# Patient Record
Sex: MALE | Race: OTHER
[De-identification: names, ages, dates, MRNs, and addresses within clinical notes are randomized per-mention and may not be internally consistent; named-entity substitution may affect disease eponyms.]

---

## 2017-12-12 ENCOUNTER — HOSPITAL ENCOUNTER (EMERGENCY)
Dept: HOSPITAL 24 - ER | Age: 32
Discharge: HOME | End: 2017-12-12
Payer: SELF-PAY

## 2017-12-12 VITALS — SYSTOLIC BLOOD PRESSURE: 175 MMHG | DIASTOLIC BLOOD PRESSURE: 94 MMHG

## 2017-12-12 VITALS — BODY MASS INDEX: 33.7 KG/M2

## 2017-12-12 DIAGNOSIS — J02.0: ICD-10-CM

## 2017-12-12 DIAGNOSIS — J11.1: Primary | ICD-10-CM

## 2017-12-12 DIAGNOSIS — E87.6: ICD-10-CM

## 2017-12-12 LAB
ALBUMIN SERPL BCP-MCNC: 3.9 G/DL (ref 3.4–5)
ALP SERPL-CCNC: 188 UNITS/L (ref 46–116)
ALT SERPL W P-5'-P-CCNC: 122 UNITS/L (ref 12–78)
AST SERPL W P-5'-P-CCNC: 108 UNITS/L (ref 15–37)
BASOPHILS # BLD AUTO: 0.1 X10^3/UL (ref 0–0.1)
BASOPHILS NFR BLD AUTO: 1 % (ref 0.2–1)
BUN SERPL-MCNC: 6 MG/DL (ref 7–18)
CALCIUM ALBUM COR SERPL-SCNC: (no result) MG/DL (ref 8.5–10.1)
CALCIUM ALBUM COR SERPL-SCNC: 139 MMOL/L (ref 136–145)
CALCIUM SERPL-MCNC: 8.5 MG/DL (ref 8.5–10.1)
CHLORIDE SERPL-SCNC: 97 MMOL/L (ref 98–107)
CO2 SERPL-SCNC: 23.5 MMOL/L (ref 21–32)
CREAT SERPL-MCNC: 0.81 MG/DL (ref 0.7–1.3)
CRP SERPL-MCNC: 17.3 MG/L (ref 0–3)
EGFR  BLACK RACES: > 60 (ref 60–?)
EOSINOPHIL # BLD AUTO: 0.1 X10^3/UL (ref 0–0.2)
EOSINOPHIL NFR BLD AUTO: 1.4 % (ref 0.9–2.9)
ERYTHROCYTE [DISTWIDTH] IN BLOOD BY AUTOMATED COUNT: 12.8 % (ref 11.6–16.5)
HCT VFR BLD AUTO: 49.3 % (ref 42–54)
HGB BLD-MCNC: 16.9 G/DL (ref 13.5–18)
LYMPHOCYTES # BLD AUTO: 0.9 X10^3/UL (ref 1.3–2.9)
LYMPHOCYTES NFR BLD AUTO: 9.2 % (ref 21–51)
MCH RBC QN AUTO: 26.7 PG (ref 27–34)
MCHC RBC AUTO-ENTMCNC: 34.3 G/DL (ref 33–35)
MCV RBC AUTO: 77.8 FL (ref 80–100)
MONOCYTES # BLD AUTO: 0.7 X10^3/UL (ref 0.3–0.8)
MONOCYTES NFR BLD AUTO: 7.9 % (ref 0–13)
NEUTROPHILS # BLD AUTO: 7.6 X10^3/UL (ref 2.2–4.8)
NEUTROPHILS NFR BLD AUTO: 80.5 % (ref 42–75)
PLATELET # BLD: 279 X10^3/UL (ref 150–450)
PMV BLD AUTO: 8.6 FL (ref 7.4–11)
PROT SERPL-MCNC: 8.4 G/DL (ref 6.4–8.2)
RBC # BLD AUTO: 6.34 X10^6/UL (ref 4.7–6)
SODIUM SERPL-SCNC: 134 MMOL/L (ref 136–145)
WBC NRBC COR # BLD AUTO: 9.5 X10^3/UL (ref 3.6–10)

## 2017-12-12 PROCEDURE — 85025 COMPLETE CBC W/AUTO DIFF WBC: CPT

## 2017-12-12 PROCEDURE — 87502 INFLUENZA DNA AMP PROBE: CPT

## 2017-12-12 PROCEDURE — 96367 TX/PROPH/DG ADDL SEQ IV INF: CPT

## 2017-12-12 PROCEDURE — 99283 EMERGENCY DEPT VISIT LOW MDM: CPT

## 2017-12-12 PROCEDURE — 96365 THER/PROPH/DIAG IV INF INIT: CPT

## 2017-12-12 PROCEDURE — 36415 COLL VENOUS BLD VENIPUNCTURE: CPT

## 2017-12-12 PROCEDURE — 87880 STREP A ASSAY W/OPTIC: CPT

## 2017-12-12 PROCEDURE — 80053 COMPREHEN METABOLIC PANEL: CPT

## 2017-12-12 PROCEDURE — 71010: CPT

## 2017-12-12 PROCEDURE — 86140 C-REACTIVE PROTEIN: CPT

## 2017-12-12 NOTE — DR.GENAD
HPI





- PCP


Primary Care Physician: [facility]





- Complaint/Symptoms


Chief Complaint Doctors Comments: Patient presents to the ED with complaint of 

being sick for one week with cough, congestion, fever and not feeling.  Today 

he states that he passed out at work. He has a history of hypertension


Chief Complaint:: "I FAINTED AT WORK([employer]) JUST A FEW MINUTES AGO. I HAVE THE 

WORST HEADACHE. I HAVE HAD A COLD , COUGH , CONGESTION FOR THE LAST 2 DAYS. THE 

LAST 3 HRS I HAVE JUST FELT AWEFUL. MY LITTLE GIRL HAS BEEN SICK WITH A COLD, I 

THINK SHE GAVE ME IT. MY CHEST ONLY HURTS WHEN I TRY TO BREATH."





- Source


History Provided: Patient





- Mode of Arrival


Mode of Arrival: Ambulatory





- Timing


Onset of Chief Complaint: 12/10/17





PMH





- PMH


Past Medical History: Yes


Past Medical History: Anxiety, Hypertension


Past Surgical History: No





- Family History


History of Family Medical Conditions: Yes


Family Medical History: Diabetes Mellitus, Hypertension





- Social History


Type of Tobacco Use: None


Alcohol Use: None


Do you use any recreational Drugs:: No


Lives With: Spouse, Family


Lives Where: Home





- infectious screening


Have you traveled outside the country in the last 6 months?: No


Isolation: Standard





ROS





- Review of Systems


Eyes: No Symptoms Reported


ENTM: No Symptoms Reported


Respiratoy: No Symptoms Reported


Cardiovascular: No Symptoms Reported


Gastrointestinal/Abdominal: No Symptoms Reported


Genitourinary: No Symptoms Reported


Neurological: No Symptoms Reported


Musculoskeletal: No Symptoms Reported


Integumentary: No Symptoms Reported


Hematologic/Lymphatic: No Symptoms Reported


Endocrine: No Symptoms Reported


Psychiatric: No Symptoms Reported


All Other Systems: Reviewed and Negative





PE





- Vital Signs


Vitals: 


 





Temperature                      100.7 F


Pulse Rate                       120


Respiratory Rate                 16


Blood Pressure                   182/111


O2 Sat by Pulse Oximetry         95











- General


Limitations: No Limitations


General Appearance: Alert, In No Apparent Distress





- Head


Head Exam: Normal Inspection, Atraumatic





- Eyes


Eye exam: Normal Appearance, PERRL, EOMI





- ENT


ENT Exam: negative: Normal  External Ear Exam (Left external red, tender tragus

, rhinorrhea)


External Ear Exam: Pain with Movement, Other (left canal erythematous)


TM/Canal Exam: Bilateral Normal


Nose Exam: Other (rhinorrhea)


Mouth Exam: Normal Inspection


Throat Exam: Normal Inspection





- Neck


Neck Exam: Normal Inspection, Full ROM





- Chest


Chest Inspection: Normal Inspection





- Respiratory


Respiratory Exam: Normal Lung Sounds Bilat


Respiratory Exam: Bilateral Clear to Auscultation





- Cardiovascular


Cardiovascular Exam: Regular Rate, Normal Rhythm





- Abdominal Exam


Abdominal Exam: Normal Inspection, Normal Bowel Sounds


Abdominal Tenderness: negative: RUQ, RLQ, LUQ, LLQ, Epigastrium, Suprapubic, 

Diffuse, Mild, Moderate, Severe, Other





- Extremities


Extremities Exam: Normal Inspection, Full ROM





- Back


Back Exam: Normal Inspection





- Neurologic


Neurological Exam: Alert, Oriented X3, CN II-XII Intact





- Psychiatric


Psychiatric Exam: Normal Affect





- Skin


Skin Exam: Warm, Dry, Intact





Course





- Reevaluation


1st: Improved





ROR





- Labs Reviewed


Laboratory Results Reviewed?: Yes (potassium low)


Result Diagrams: 


 12/12/17 19:39





 12/12/17 19:39


Laboratory: 


 











WBC  9.5 X10^3/uL (3.6-10.0)   12/12/17  19:39    


 


RBC  6.34 X10^6/uL (4.7-6.0)  H  12/12/17  19:39    


 


Hgb  16.9 g/dL (13.5-18.0)   12/12/17  19:39    


 


Hct  49.3 % (42.0-54.0)   12/12/17  19:39    


 


MCV  77.8 fL (80.0-100.0)  L  12/12/17  19:39    


 


MCH  26.7 pg (27.0-34.0)  L  12/12/17  19:39    


 


MCHC  34.3 g/dL (33.0-35.0)   12/12/17  19:39    


 


RDW  12.8 % (11.6-16.5)   12/12/17  19:39    


 


Plt Count  279 X10^3/uL (150.0-450.0)   12/12/17  19:39    


 


MPV  8.6 fL (7.4-11.0)   12/12/17  19:39    


 


Neut %  80.5 % (42.0-75.0)  H  12/12/17  19:39    


 


Lymph %  9.2 % (21.0-51.0)  L  12/12/17  19:39    


 


Mono %  7.9 % (0.0-13.0)   12/12/17  19:39    


 


Eos %  1.4 % (0.9-2.9)   12/12/17  19:39    


 


Baso %  1.0 % (0.2-1.0)   12/12/17  19:39    


 


Neut #  7.6 x10^3/uL (2.2-4.8)  H  12/12/17  19:39    


 


Lymph #  0.9 X10^3/uL (1.3-2.9)  L  12/12/17  19:39    


 


Mono #  0.7 x10^3/uL (0.3-0.8)   12/12/17  19:39    


 


Eos #  0.1 x10^3/uL (0.0-0.2)   12/12/17  19:39    


 


Baso #  0.1 X10^3/uL (0.0-0.1)   12/12/17  19:39    


 


Absolute Nucleated RBC  0.0 /100WBC  12/12/17  19:39    


 


Sodium  134 mmol/L (136-145)  L  12/12/17  19:39    


 


Corrected Sodium  139 mmol/L (136-145)   12/12/17  19:39    


 


Potassium  3.3 mmol/L (3.5-5.1)  L  12/12/17  19:39    


 


Chloride  97 mmol/L ()  L  12/12/17  19:39    


 


Carbon Dioxide  23.5 mmol/L (21-32)   12/12/17  19:39    


 


BUN  6 mg/dL (7-18)  L  12/12/17  19:39    


 


Creatinine  0.81 mg/dL (0.70-1.30)   12/12/17  19:39    


 


Est GFR (MDRD) Af Amer  > 60  (>60)   12/12/17  19:39    


 


Est GFR (MDRD) Non-Af  > 60  (>60)   12/12/17  19:39    


 


Glucose  295 mg/dL (65-99)  H  12/12/17  19:39    


 


Calcium  8.5 mg/dL (8.5-10.1)   12/12/17  19:39    


 


Corrected Calcium  TNP   12/12/17  19:39    


 


Total Bilirubin  0.50 mg/dL (0.2-1.0)   12/12/17  19:39    


 


AST  108 Units/L (15-37)  H  12/12/17  19:39    


 


ALT  122 Units/L (12-78)  H  12/12/17  19:39    


 


Alkaline Phosphatase  188 Units/L ()  H  12/12/17  19:39    


 


C-Reactive Protein  17.30 mg/L (0-3.0)  H  12/12/17  19:39    


 


Total Protein  8.4 g/dL (6.4-8.2)  H  12/12/17  19:39    


 


Albumin  3.9 g/dL (3.4-5.0)   12/12/17  19:39    


 


Globulin  4.5 g/dL (2.5-4.5)   12/12/17  19:39    


 


Albumin/Globulin Ratio  0.9 Ratio (1.1-2.1)  L  12/12/17  19:39    


 


Influenza Type A (PCR)  Positive  (NEGATIVE)  A  12/12/17  19:39    


 


Influenza Type B (PCR)  Negative  (NEGATIVE)   12/12/17  19:39    


 


Streptococcus Screen  Positive  (NEGATIVE)  A  12/12/17  19:39    














- XRAY


XRAY Interpreted by: Radiologist (Chest: No acute cardiopulmonary disease)





- Diagnosis


Discharge Problem: 


 Influenza A, Strep pharyngitis, Hypokalemia








- Discharge Plan


Condition: Stable


Prescriptions: 


Amoxicillin 500 mg PO BID #20 capsule





- Follow ups/Referrals


Follow ups/Referrals: 


[facility],None [Primary Care Provider] - 3 days





- Instructions

## 2017-12-12 NOTE — RAD
HISTORY:  32-year-old male with cold, cough and congestion.



Study: Frontal view of the chest.



Comparison: None.



Findings:



Study is limited secondary to patient body habitus and excessive quantum mottle.



The trachea is midline.  The cardiac silhouette is unremarkable.  The lungs are clear without focal c
onsolidation, effusion or pneumothorax. Soft tissues are unremarkable.  Osseous structures are unrema
rkable.  



IMPRESSION: 

 

1.  No acute cardiopulmonary disease.







Reported By:Electronically Signed by CHEKO MENDEZ MD at 12/12/2017 7:54:35 PM

## 2018-03-19 ENCOUNTER — HOSPITAL ENCOUNTER (EMERGENCY)
Dept: HOSPITAL 24 - ER | Age: 33
Discharge: HOME | End: 2018-03-19
Payer: SELF-PAY

## 2018-03-19 VITALS — SYSTOLIC BLOOD PRESSURE: 161 MMHG | DIASTOLIC BLOOD PRESSURE: 99 MMHG

## 2018-03-19 VITALS — BODY MASS INDEX: 33.7 KG/M2

## 2018-03-19 DIAGNOSIS — R10.84: ICD-10-CM

## 2018-03-19 DIAGNOSIS — B96.81: ICD-10-CM

## 2018-03-19 DIAGNOSIS — K52.89: Primary | ICD-10-CM

## 2018-03-19 DIAGNOSIS — R11.2: ICD-10-CM

## 2018-03-19 LAB
ALBUMIN SERPL BCP-MCNC: 3.4 G/DL (ref 3.4–5)
ALP SERPL-CCNC: 186 UNITS/L (ref 46–116)
ALT SERPL W P-5'-P-CCNC: 121 UNITS/L (ref 12–78)
AMYLASE SERPL-CCNC: 31 UNITS/L (ref 25–115)
AST SERPL W P-5'-P-CCNC: 50 UNITS/L (ref 15–37)
BACTERIA URNS QL MICRO: NEGATIVE /HPF
BASOPHILS # BLD AUTO: 0.1 X10^3/UL (ref 0–0.1)
BASOPHILS NFR BLD AUTO: 0.6 % (ref 0.2–1)
BILIRUB UR QL STRIP.AUTO: NEGATIVE
BUN SERPL-MCNC: 7 MG/DL (ref 7–18)
CALCIUM ALBUM COR SERPL-SCNC: (no result) MG/DL (ref 8.5–10.1)
CALCIUM ALBUM COR SERPL-SCNC: 139 MMOL/L (ref 136–145)
CALCIUM SERPL-MCNC: 7.8 MG/DL (ref 8.5–10.1)
CHLORIDE SERPL-SCNC: 99 MMOL/L (ref 98–107)
CLARITY UR: (no result)
CO2 SERPL-SCNC: 29 MMOL/L (ref 21–32)
COLOR UR AUTO: YELLOW
CREAT SERPL-MCNC: 0.59 MG/DL (ref 0.7–1.3)
EGFR  BLACK RACES: > 60 (ref 60–?)
EOSINOPHIL # BLD AUTO: 0.3 X10^3/UL (ref 0–0.2)
EOSINOPHIL NFR BLD AUTO: 2.1 % (ref 0.9–2.9)
ERYTHROCYTE [DISTWIDTH] IN BLOOD BY AUTOMATED COUNT: 13.7 % (ref 11.6–16.5)
GLUCOSE UR QL STRIP.AUTO: (no result)
HCT VFR BLD AUTO: 48.4 % (ref 42–54)
HGB BLD-MCNC: 16.7 G/DL (ref 13.5–18)
KETONES UR QL STRIP.AUTO: (no result)
LEUKOCYTE ESTERASE UR QL STRIP.AUTO: (no result)
LIPASE SERPL-CCNC: 94 UNITS/L (ref 73–393)
LYMPHOCYTES # BLD AUTO: 1.5 X10^3/UL (ref 1.3–2.9)
LYMPHOCYTES NFR BLD AUTO: 12.8 % (ref 21–51)
MCH RBC QN AUTO: 26.8 PG (ref 27–34)
MCHC RBC AUTO-ENTMCNC: 34.5 G/DL (ref 33–35)
MCV RBC AUTO: 77.7 FL (ref 80–100)
MONOCYTES # BLD AUTO: 0.5 X10^3/UL (ref 0.3–0.8)
MONOCYTES NFR BLD AUTO: 4.5 % (ref 0–13)
MUCOUS THREADS #/AREA URNS HPF: (no result) /HPF
NEUTROPHILS # BLD AUTO: 9.6 X10^3/UL (ref 2.2–4.8)
NEUTROPHILS NFR BLD AUTO: 80 % (ref 42–75)
NITRITE UR QL STRIP.AUTO: NEGATIVE
PH UR STRIP.AUTO: 5 [PH] (ref 5–8)
PLATELET # BLD: 268 X10^3/UL (ref 150–450)
PMV BLD AUTO: 8.9 FL (ref 7.4–11)
PROT SERPL-MCNC: 7.8 G/DL (ref 6.4–8.2)
PROT UR QL STRIP.AUTO: (no result)
RBC # BLD AUTO: 6.22 X10^6/UL (ref 4.7–6)
RBC # UR STRIP.AUTO: (no result) /UL
RBC #/AREA URNS HPF: (no result) /HPF
SODIUM SERPL-SCNC: 135 MMOL/L (ref 136–145)
SP GR UR STRIP.AUTO: 1.02 (ref 1–1.03)
SQUAMOUS URNS QL MICRO: NEGATIVE /HPF
UROBILINOGEN UR QL STRIP.AUTO: NORMAL
WBC NRBC COR # BLD AUTO: 12.1 X10^3/UL (ref 3.6–10)

## 2018-03-19 PROCEDURE — 85025 COMPLETE CBC W/AUTO DIFF WBC: CPT

## 2018-03-19 PROCEDURE — 99282 EMERGENCY DEPT VISIT SF MDM: CPT

## 2018-03-19 PROCEDURE — 86677 HELICOBACTER PYLORI ANTIBODY: CPT

## 2018-03-19 PROCEDURE — 36415 COLL VENOUS BLD VENIPUNCTURE: CPT

## 2018-03-19 PROCEDURE — 82150 ASSAY OF AMYLASE: CPT

## 2018-03-19 PROCEDURE — 96374 THER/PROPH/DIAG INJ IV PUSH: CPT

## 2018-03-19 PROCEDURE — 74022 RADEX COMPL AQT ABD SERIES: CPT

## 2018-03-19 PROCEDURE — 99283 EMERGENCY DEPT VISIT LOW MDM: CPT

## 2018-03-19 PROCEDURE — 81001 URINALYSIS AUTO W/SCOPE: CPT

## 2018-03-19 PROCEDURE — 80053 COMPREHEN METABOLIC PANEL: CPT

## 2018-03-19 PROCEDURE — S0028 INJECTION, FAMOTIDINE, 20 MG: HCPCS

## 2018-03-19 PROCEDURE — 83690 ASSAY OF LIPASE: CPT

## 2018-03-19 PROCEDURE — 96365 THER/PROPH/DIAG IV INF INIT: CPT

## 2018-03-19 PROCEDURE — 96375 TX/PRO/DX INJ NEW DRUG ADDON: CPT

## 2018-03-19 NOTE — RAD
HISTORY:  Abdominal pain.  Vomiting.  Diarrhea.



Study:  PA chest with supine and upright abdominal views



Comparison:  12/12/2017



Findings:  



The lungs are clear.  The heart size is normal.  No acute bony abnormalities are identified.



Examination of the abdomen reveals scattered large and small bowel gas.  A small to moderate amount o
f stool is noted in the distal transverse colon and proximal descending colon.  Gas and stool is note
d in the region of the rectum.  A few small air-fluid levels are present, predominating in the lower 
abdomen.  No abnormal calcifications seen to overlie the renal shadows.  No acute bony abnormalities 
are identified.



IMPRESSION:

1.  No radiographic evidence of acute cardiopulmonary disease or significant change is noted when com
pared to the prior examination.

2.  I see no definite evidence of bowel obstruction or pneumoperitoneum, however, there are scattered
 air-fluid levels.  Clinical correlation is recommended.

3. Moderate amount of stool present within the distal transverse colon and proximal descending colon.








Reported By:Electronically Signed by EMMA MARTINO MD at 3/19/2018 11:10:53 AM

## 2018-03-19 NOTE — DR.ABDMALE
HPI





- Time seen


Time seen: 10:20





- PCP


Primary Care Physician: NONI





- HPI comment


HPI Comment: FEELING WEAK. NOT HOLDING DOWN FLUID OR MEDICATION. BP ELEVATED 

AND GIVEN PATIENT HEADACHE.





- Complaint


Chief Complaint Doctors Comments: N/V/D AND HEADACHE TIMES ONE DAY. FESTIVAL 

THIS WEEKEND AND UPON RETURN NOTED SYMPTOMS.


Chief Complaint:: PT C/O GOING TO A FESTIVAL THE WEEKEND AND EATING AND THINKS 

HE MAY HAVE FOOD POISONING , PT IS HAVING N/V/D AND HA,,,, PT HAS NOT BEEN ABLE 

TO KEEP HIS BLOOD PRESSURE MEDS DOWN..


Self Treatment fo Chief Complaint: PEPTO, TYLENOL





- Reviewed


Nurses Notes Review: Yes





- Mode of arrival


Mode of Arrival: Ambulatory





- Timing


Onset of Chief Complaint: 03/18/18


Came on: Suddenly





- Duration


Duration: Constant


Duration: Days





- Location


Location: Diffuse





- Severity


Severity: Moderate





- Quality


Quality: Cramping





- Context


Onset: Suddenly


History of: None





- Modifying factors


Worsening Factors: Nothing


Improving Factors: Nothing





- Associated signs and symptoms


Associated Signs and Symptoms: Nausea, Vomiting, Diarrhea





PMH





- PMH


Past Medical History: Yes


Past Medical History: Diabetes, Hypertension


Past Medical History Comment: THC USAGE .


Past Surgical History: No





- Family History


History of Family Medical Conditions: No


Family Medical History: Diabetes Mellitus, Hypertension





- Social History


Does patient currently use any type of tobacco product: No


Have you used tobacco products in the last 12 months: No


Type of Tobacco Use: None


Does any household member use tobacco: No


Alcohol Use: None


Do you use any recreational Drugs:: No


Lives With: Family


Lives Where: Home





- infectious screening


In the last 2 months have you had wt loss of >10#?: NO


Have you had fever, night sweats or hemotysis?: No


Have you traveled outside the country in the last 6 months?: No


Isolation: Standard





ROS





- Review of Systems


Constitutional: No Symptoms Reported


Eyes: No Symptoms Reported


ENTM: No Symptoms Reported


Respiratoy: No Symptoms Reported


Cardiovascular: Other (BP ELEVATED.)


Gastrointestinal/Abdominal: Abdominal Pain, Diarrhea, Nausea, Vomiting


Genitourinary: No Symptoms Reported


Neurological: Headache


Musculoskeletal: No Symptoms Reported


Integumentary: No Symptoms Reported


Hematologic/Lymphatic: No Symptoms Reported


Endocrine: No Symptoms Reported


All Other Systems: Reviewed and Negative





PE





- Vital Signs


Vital Signs: 


 











  Temp Pulse Resp BP BP Pulse Ox


 


 03/19/18 13:09      161/99 


 


 03/19/18 09:24  98.2 F  95 H  20  192/111   96


 


 12/12/17 21:19     175/94  175/94 














- General


Limitations: No Limitations


General Appearance: Alert





- Head


Head Exam: Normal Inspection





- Eyes


Eye exam: Normal Appearance





- ENT


ENT Exam: Normal  External Ear Exam





- Neck


Neck Exam: Trachea Midline





- Chest


Chest Inspection: Symmetric Chest Wall Rise





- Respiratory


Respiratory Exam: Normal Lung Sounds Bilat


Respiratory Exam: Bilateral Clear to Auscultation





- Cardiovascular


Cardiovascular Exam: Regular Rate, Normal Rhythm, Normal Heart Sounds





- Abdominal Exam


Abdominal Exam: Normal Bowel Sounds, Soft, Tenderness


Abdominal Tenderness: Diffuse, Moderate





- Rectal


Rectal Exam: Deferred





- Back


Back Exam: Normal Inspection





- Extremeties


Extremities Exam: Normal Inspection





- 


 Exam: Male: Deferred





- Neurologic


Neurological Exam: Alert, Oriented X3





- Psychiatric


Psychiatric Exam: Normal Affect, Normal Mood





- Skin


Skin Exam: Normal Color





MDM





- Differential Diagnosis


Differential Diagnosis: Bowel Obstruction, Diverticular disease, Gastritus/PUD, 

Gastroenteritis, Pancreatitis, Urinary tract infection, Urolithiasis





Course





- Treatment


Treatment: SEE ORDERS.





- Education/Counseling


Education/Counseling: Patient, Education


Educated On: Treatment, Diagnosis, Needs for Follow Up





ROR





- Labs Reviewed


Laboratory Results Reviewed?: Yes


Result Diagrams: 


 03/19/18 10:20





 03/19/18 10:20


Laboratory: 


 











WBC  12.1 X10^3/uL (3.6-10.0)  H  03/19/18  10:20    


 


RBC  6.22 X10^6/uL (4.7-6.0)  H  03/19/18  10:20    


 


Hgb  16.7 g/dL (13.5-18.0)   03/19/18  10:20    


 


Hct  48.4 % (42.0-54.0)   03/19/18  10:20    


 


MCV  77.7 fL (80.0-100.0)  L  03/19/18  10:20    


 


MCH  26.8 pg (27.0-34.0)  L  03/19/18  10:20    


 


MCHC  34.5 g/dL (33.0-35.0)   03/19/18  10:20    


 


RDW  13.7 % (11.6-16.5)   03/19/18  10:20    


 


Plt Count  268 X10^3/uL (150.0-450.0)   03/19/18  10:20    


 


MPV  8.9 fL (7.4-11.0)   03/19/18  10:20    


 


Neut % (Auto)  80.0 % (42.0-75.0)  H  03/19/18  10:20    


 


Lymph % (Auto)  12.8 % (21.0-51.0)  L  03/19/18  10:20    


 


Mono % (Auto)  4.5 % (0.0-13.0)   03/19/18  10:20    


 


Eos % (Auto)  2.1 % (0.9-2.9)   03/19/18  10:20    


 


Baso % (Auto)  0.6 % (0.2-1.0)   03/19/18  10:20    


 


Neut # (Auto)  9.6 x10^3/uL (2.2-4.8)  H  03/19/18  10:20    


 


Lymph # (Auto)  1.5 X10^3/uL (1.3-2.9)   03/19/18  10:20    


 


Mono # (Auto)  0.5 x10^3/uL (0.3-0.8)   03/19/18  10:20    


 


Eos # (Auto)  0.3 x10^3/uL (0.0-0.2)  H  03/19/18  10:20    


 


Baso # (Auto)  0.1 X10^3/uL (0.0-0.1)   03/19/18  10:20    


 


Absolute Nucleated RBC  0.0 /100WBC  03/19/18  10:20    


 


Sodium  135 mmol/L (136-145)  L  03/19/18  10:20    


 


Corrected Sodium  139 mmol/L (136-145)   03/19/18  10:20    


 


Potassium  3.7 mmol/L (3.5-5.1)   03/19/18  10:20    


 


Chloride  99 mmol/L ()   03/19/18  10:20    


 


Carbon Dioxide  29.0 mmol/L (21-32)   03/19/18  10:20    


 


BUN  7 mg/dL (7-18)   03/19/18  10:20    


 


Creatinine  0.59 mg/dL (0.70-1.30)  L  03/19/18  10:20    


 


Est GFR (MDRD) Af Amer  > 60  (>60)   03/19/18  10:20    


 


Est GFR (MDRD) Non-Af  > 60  (>60)   03/19/18  10:20    


 


Glucose  260 mg/dL (65-99)  H  03/19/18  10:20    


 


Calcium  7.8 mg/dL (8.5-10.1)  L  03/19/18  10:20    


 


Corrected Calcium  TNP   03/19/18  10:20    


 


Total Bilirubin  0.70 mg/dL (0.2-1.0)   03/19/18  10:20    


 


AST  50 Units/L (15-37)  H  03/19/18  10:20    


 


ALT  121 Units/L (12-78)  H  03/19/18  10:20    


 


Alkaline Phosphatase  186 Units/L ()  H  03/19/18  10:20    


 


Total Protein  7.8 g/dL (6.4-8.2)   03/19/18  10:20    


 


Albumin  3.4 g/dL (3.4-5.0)   03/19/18  10:20    


 


Globulin  4.4 g/dL (2.5-4.5)   03/19/18  10:20    


 


Albumin/Globulin Ratio  0.8 Ratio (1.1-2.1)  L  03/19/18  10:20    


 


Amylase  31 Units/L ()   03/19/18  10:20    


 


Lipase  94 Units/L ()   03/19/18  10:20    


 


Specimen Type  Clean catch urine   03/19/18  09:54    


 


Urine Color  Yellow  (YELLOW)   03/19/18  09:54    


 


Urine Appearance  Hazy  (CLEAR)   03/19/18  09:54    


 


Urine pH  5.0  (5.0 - 8.0)   03/19/18  09:54    


 


Ur Specific Gravity  1.020  (1.000-1.030)   03/19/18  09:54    


 


Urine Protein  2+  (NEGATIVE)   03/19/18  09:54    


 


Urine Glucose (UA)  4+  (NEGATIVE)   03/19/18  09:54    


 


Urine Ketones  2+  (NEGATIVE)   03/19/18  09:54    


 


Urine Occult Blood  3+  (NEGATIVE)   03/19/18  09:54    


 


Urine Nitrite  Negative  (NEGATIVE)   03/19/18  09:54    


 


Urine Bilirubin  Negative  (NEGATIVE)   03/19/18  09:54    


 


Urine Urobilinogen  Normal  (NORMAL)   03/19/18  09:54    


 


Ur Leukocyte Esterase  2+  (NEGATIVE)   03/19/18  09:54    


 


Urine RBC  5-10 /HPF (NONE SEEN)   03/19/18  09:54    


 


Urine WBC  3-5 /HPF (NONE SEEN)   03/19/18  09:54    


 


Ur Squamous Epith Cells  Negative /HPF (NEGATIVE)   03/19/18  09:54    


 


Urine Bacteria  Negative /HPF (NEGATIVE)   03/19/18  09:54    


 


Urine Mucus  Few /HPF (NEGATIVE)   03/19/18  09:54    


 


Ur Culture Indicated?  No/not indicated   03/19/18  09:54    


 


H. pylori IgG Antibody  Positive  (NEGATIVE)  A  03/19/18  10:20    














- XRAY


XRAY Interpreted by: Radiologist


XRAY Findings: REPORT DISCUSS WITH PATIENT.





- Diagnosis


Discharge Problem: 


 Acute gastroenteritis, Helicobacter pylori ab+, Abdominal pain, Nausea and 

vomiting in adult patient








- Discharge Plan


Disposition: 01 HOME, SELF-CARE


Condition: Stable


Prescriptions: 


Dicyclomine HCl [Bentyl Cap 10 mg] 10 mg PO TID PRN #15 cap


 PRN Reason: 


Diphenoxylate/Atropine [Lomotil] 1 tab PO TID PRN #15 tab


 PRN Reason: 


Ondansetron [Zofran ODT 8 mg] 8 mg PO Q8H PRN #12 tab


 PRN Reason: Nausea/Vomiting





- Follow ups/Referrals


Follow ups/Referrals: 


[facility],None [Primary Care Provider] - 2 days


CARLOS NAVAS [STAFF PHYSICIAN] - 2 days





- Instructions


Instructions:  Viral Gastroenteritis, Adult, Easy-to-Read, Nausea and Vomiting, 

Adult, Easy-to-Read, Helicobacter Pylori Antibodies Test


Additional Instructions: 


RETURN TO ED IF WORSE. HAVE FOLLOW UP DOCTOR TREAT POSITIVE H PYLORI TEST WHEN 

YOU IMPROVE.

## 2019-02-19 ENCOUNTER — HOSPITAL ENCOUNTER (EMERGENCY)
Dept: HOSPITAL 67 - ED | Age: 34
Discharge: HOME | End: 2019-02-19
Payer: COMMERCIAL

## 2019-02-19 VITALS — HEIGHT: 70 IN | BODY MASS INDEX: 34.36 KG/M2 | WEIGHT: 240 LBS

## 2019-02-19 VITALS — TEMPERATURE: 98 F

## 2019-02-19 VITALS — DIASTOLIC BLOOD PRESSURE: 86 MMHG | SYSTOLIC BLOOD PRESSURE: 157 MMHG

## 2019-02-19 DIAGNOSIS — K52.9: Primary | ICD-10-CM

## 2019-02-19 LAB
PLATELET # BLD: 324 K/UL (ref 142–355)
POTASSIUM SERPL-SCNC: 3.9 MMOL/L (ref 3.6–5.2)
SODIUM SERPL-SCNC: 136 MMOL/L (ref 136–145)

## 2019-02-19 PROCEDURE — 80307 DRUG TEST PRSMV CHEM ANLYZR: CPT

## 2019-02-19 PROCEDURE — 80053 COMPREHEN METABOLIC PANEL: CPT

## 2019-02-19 PROCEDURE — 96374 THER/PROPH/DIAG INJ IV PUSH: CPT

## 2019-02-19 PROCEDURE — 96365 THER/PROPH/DIAG IV INF INIT: CPT

## 2019-02-19 PROCEDURE — 99284 EMERGENCY DEPT VISIT MOD MDM: CPT

## 2019-02-19 PROCEDURE — 96375 TX/PRO/DX INJ NEW DRUG ADDON: CPT

## 2019-02-19 PROCEDURE — 85027 COMPLETE CBC AUTOMATED: CPT

## 2019-02-19 PROCEDURE — 81000 URINALYSIS NONAUTO W/SCOPE: CPT
